# Patient Record
(demographics unavailable — no encounter records)

---

## 2025-01-06 NOTE — HISTORY OF PRESENT ILLNESS
[de-identified] : Doing well [FreeTextEntry6] : Meds:  zoloft 100 mg  No side effects on current medication Anxiety was worse in the beginning of school year, improved now.  not feeling daily anxiety, less situational anxious,  No panic attacks  Academics: 1st year at [employer] Mood is better, much less often feeling down Sleep has been good Appetite normal Denies suicidal ideation - no longer having thoughts about not wanting to be alive  Denies other substance use (alcohol, drugs, tobacco). Hasn't Seen therapist since being away

## 2025-01-06 NOTE — DISCUSSION/SUMMARY
[] : The components of the vaccine(s) to be administered today are listed in the plan of care. The disease(s) for which the vaccine(s) are intended to prevent and the risks have been discussed with the caretaker.  The risks are also included in the appropriate vaccination information statements which have been provided to the patient's caregiver.  The caregiver has given consent to vaccinate. [FreeTextEntry1] : PHQ-9  stable at  2 Assessment of suicide risk performed Observation for suicide risk Discussion of goals, options, limitations and risks of therapy - black box warning and serotonin syndrome Meds: continue zoloft 100 mg  Continue regular therapy sessions Next Visit: 4-5 months for med check and well check unless symptoms worsening - will discuss possibly decreasing dose of meds at that time

## 2025-01-06 NOTE — HISTORY OF PRESENT ILLNESS
[de-identified] : Doing well [FreeTextEntry6] : Meds:  zoloft 100 mg  No side effects on current medication Anxiety was worse in the beginning of school year, improved now.  not feeling daily anxiety, less situational anxious,  No panic attacks  Academics: 1st year at [employer] Mood is better, much less often feeling down Sleep has been good Appetite normal Denies suicidal ideation - no longer having thoughts about not wanting to be alive  Denies other substance use (alcohol, drugs, tobacco). Hasn't Seen therapist since being away

## 2025-03-17 NOTE — HISTORY OF PRESENT ILLNESS
[de-identified] : Patient c/o bump on left arm x few months. No c/o pain. Afebrile [FreeTextEntry6] : small bump on L arm x few months Started off skin colored and raised and has now turned black and blue in the past few weeks but is mostly flat No pain No itching No fever Patient interested in birth control - sexually active and uses condoms.  Patient and mom wanted my opinion.

## 2025-03-17 NOTE — DISCUSSION/SUMMARY
[FreeTextEntry1] : Reassured lesion appears benign and seems to be resolving Monitor for now - if persists recommend dermatology evaluation Discussed that I do not prescribe oral contraceptives and that if she is interested to see GYN Discussed that is more of a personal decision, I don't see any contraindications to starting but could cause mood or anxiety problems and may have to try different meds

## 2025-03-17 NOTE — PHYSICAL EXAM
[NL] : warm, clear [de-identified] : L forearm with small blue/purple minimally raised circular lesion - non tender, not warm

## 2025-06-06 NOTE — HISTORY OF PRESENT ILLNESS
[de-identified] : Possible swollen lymph node to right side of jawline. No fevers, cough or congestion [FreeTextEntry6] : Noticed swelling about 2 days ago No illness exposures, no fevers, no vomiting Feels well otherwise No dental issues Slightly tender to touch but no redness, does not think it has changed in the past 2 days

## 2025-06-06 NOTE — DISCUSSION/SUMMARY
[FreeTextEntry1] : Appears to have benign adenopathy Use warm soaks to the area Tylenol if needed for discomfort Should lesions or glands enlarged, become more tender or red to come to be rechecked Otherwise should disappear within the next 2 weeks

## 2025-06-06 NOTE — HISTORY OF PRESENT ILLNESS
[de-identified] : Possible swollen lymph node to right side of jawline. No fevers, cough or congestion [FreeTextEntry6] : Noticed swelling about 2 days ago No illness exposures, no fevers, no vomiting Feels well otherwise No dental issues Slightly tender to touch but no redness, does not think it has changed in the past 2 days

## 2025-06-06 NOTE — PHYSICAL EXAM
[Tender] : tender [Enlarged] : enlarged [Submandibular] : submandibular [Anterior Cervical] : anterior cervical [Supple] : supple [NL] : clear to auscultation bilaterally [Clear] : clear [de-identified] : Right mandibular and anterior cervical adenopathy, rubbery, slightly tender, not erythematous [de-identified] : right  [de-identified] : No skin or scalp lesions

## 2025-06-06 NOTE — HISTORY OF PRESENT ILLNESS
[de-identified] : Possible swollen lymph node to right side of jawline. No fevers, cough or congestion [FreeTextEntry6] : Noticed swelling about 2 days ago No illness exposures, no fevers, no vomiting Feels well otherwise No dental issues Slightly tender to touch but no redness, does not think it has changed in the past 2 days

## 2025-06-06 NOTE — PHYSICAL EXAM
[Tender] : tender [Enlarged] : enlarged [Submandibular] : submandibular [Anterior Cervical] : anterior cervical [Supple] : supple [NL] : clear to auscultation bilaterally [Clear] : clear [de-identified] : Right mandibular and anterior cervical adenopathy, rubbery, slightly tender, not erythematous [de-identified] : right  [de-identified] : No skin or scalp lesions

## 2025-06-06 NOTE — PHYSICAL EXAM
Procedure To Be Performed At Next Visit: Cryotherapy Detail Level: Detailed [Tender] : tender [Enlarged] : enlarged [Submandibular] : submandibular [Anterior Cervical] : anterior cervical [Supple] : supple [NL] : clear to auscultation bilaterally [Clear] : clear [de-identified] : Right mandibular and anterior cervical adenopathy, rubbery, slightly tender, not erythematous [de-identified] : right  [de-identified] : No skin or scalp lesions